# Patient Record
Sex: MALE | Race: BLACK OR AFRICAN AMERICAN | Employment: OTHER
[De-identification: names, ages, dates, MRNs, and addresses within clinical notes are randomized per-mention and may not be internally consistent; named-entity substitution may affect disease eponyms.]

---

## 2018-01-15 PROBLEM — N40.0 BENIGN PROSTATIC HYPERPLASIA WITHOUT LOWER URINARY TRACT SYMPTOMS: Status: ACTIVE | Noted: 2018-01-15

## 2018-01-15 PROBLEM — N40.0 ENLARGED PROSTATE: Status: RESOLVED | Noted: 2018-01-15 | Resolved: 2018-01-15

## 2018-01-15 PROBLEM — N40.0 ENLARGED PROSTATE: Status: ACTIVE | Noted: 2018-01-15

## 2018-06-18 PROBLEM — I44.2 COMPLETE HEART BLOCK (HCC): Status: ACTIVE | Noted: 2018-06-18

## 2018-10-26 PROBLEM — N42.9 DISEASE OF PROSTATE: Status: ACTIVE | Noted: 2018-10-26

## 2021-05-03 PROBLEM — I35.0 AORTIC VALVE STENOSIS: Status: ACTIVE | Noted: 2021-05-03

## 2021-07-26 PROBLEM — Z95.0 PACEMAKER: Status: ACTIVE | Noted: 2021-07-26

## 2022-04-21 PROBLEM — I12.9 RENAL HYPERTENSION: Status: ACTIVE | Noted: 2022-04-21

## 2022-04-21 PROBLEM — N18.31 CHRONIC RENAL FAILURE, STAGE 3A (HCC): Status: ACTIVE | Noted: 2022-04-21

## 2022-06-06 ENCOUNTER — HOSPITAL ENCOUNTER (OUTPATIENT)
Dept: GENERAL RADIOLOGY | Facility: CLINIC | Age: 82
Discharge: HOME | End: 2022-06-08
Payer: MEDICARE

## 2022-06-06 DIAGNOSIS — M54.50 LUMBAR BACK PAIN: ICD-10-CM

## 2022-06-06 PROCEDURE — 72100 X-RAY EXAM L-S SPINE 2/3 VWS: CPT

## 2022-11-18 ENCOUNTER — HOSPITAL ENCOUNTER (OUTPATIENT)
Dept: CT IMAGING | Facility: CLINIC | Age: 82
Discharge: HOME | End: 2022-11-20
Payer: MEDICARE

## 2022-11-18 DIAGNOSIS — G89.29 CHRONIC BILATERAL LOW BACK PAIN WITH BILATERAL SCIATICA: ICD-10-CM

## 2022-11-18 DIAGNOSIS — M51.36 DDD (DEGENERATIVE DISC DISEASE), LUMBAR: ICD-10-CM

## 2022-11-18 DIAGNOSIS — M54.42 CHRONIC BILATERAL LOW BACK PAIN WITH BILATERAL SCIATICA: ICD-10-CM

## 2022-11-18 DIAGNOSIS — M54.41 CHRONIC BILATERAL LOW BACK PAIN WITH BILATERAL SCIATICA: ICD-10-CM

## 2022-11-18 PROCEDURE — 72131 CT LUMBAR SPINE W/O DYE: CPT

## 2025-02-11 LAB
PROSTATE SPECIFIC ANTIGEN: 1.83 NG/ML

## 2025-05-30 ENCOUNTER — HOSPITAL ENCOUNTER (OUTPATIENT)
Age: 85
Discharge: HOME | End: 2025-05-30
Payer: MEDICARE

## 2025-05-30 DIAGNOSIS — I35.0 AORTIC VALVE STENOSIS, ETIOLOGY OF CARDIAC VALVE DISEASE UNSPECIFIED: ICD-10-CM

## 2025-05-30 LAB
EGFR, POC: 37 ML/MIN/1.73M2
POC CREATININE: 1.8 MG/DL (ref 0.51–1.19)

## 2025-05-30 PROCEDURE — 75574 CT ANGIO HRT W/3D IMAGE: CPT

## 2025-05-30 PROCEDURE — 82565 ASSAY OF CREATININE: CPT

## 2025-05-30 PROCEDURE — 6360000004 HC RX CONTRAST MEDICATION: Performed by: FAMILY MEDICINE

## 2025-05-30 RX ORDER — IOPAMIDOL 755 MG/ML
124 INJECTION, SOLUTION INTRAVASCULAR
Status: COMPLETED | OUTPATIENT
Start: 2025-05-30 | End: 2025-05-30

## 2025-05-30 RX ADMIN — IOPAMIDOL 124 ML: 755 INJECTION, SOLUTION INTRAVENOUS at 10:45

## 2025-06-05 ENCOUNTER — OFFICE VISIT (OUTPATIENT)
Age: 85
End: 2025-06-05
Payer: MEDICARE

## 2025-06-05 VITALS
WEIGHT: 214 LBS | HEART RATE: 75 BPM | SYSTOLIC BLOOD PRESSURE: 151 MMHG | DIASTOLIC BLOOD PRESSURE: 80 MMHG | HEIGHT: 69 IN | BODY MASS INDEX: 31.7 KG/M2

## 2025-06-05 DIAGNOSIS — N52.8 OTHER MALE ERECTILE DYSFUNCTION: Primary | ICD-10-CM

## 2025-06-05 DIAGNOSIS — E29.1 HYPOGONADISM MALE: ICD-10-CM

## 2025-06-05 LAB
BILIRUBIN, POC: NORMAL
BLOOD URINE, POC: NORMAL
CLARITY, POC: CLEAR
COLOR, POC: YELLOW
GLUCOSE URINE, POC: >=1000 MG/DL
KETONES, POC: NORMAL
LEUKOCYTE EST, POC: NORMAL
NITRITE, POC: POSITIVE
PH, POC: NORMAL
PROTEIN, POC: 30 MG/DL
SPECIFIC GRAVITY, POC: NORMAL
UROBILINOGEN, POC: NORMAL

## 2025-06-05 PROCEDURE — 1159F MED LIST DOCD IN RCRD: CPT | Performed by: SPECIALIST

## 2025-06-05 PROCEDURE — 1123F ACP DISCUSS/DSCN MKR DOCD: CPT | Performed by: SPECIALIST

## 2025-06-05 PROCEDURE — 99204 OFFICE O/P NEW MOD 45 MIN: CPT | Performed by: SPECIALIST

## 2025-06-05 PROCEDURE — 81003 URINALYSIS AUTO W/O SCOPE: CPT | Performed by: SPECIALIST

## 2025-06-05 RX ORDER — HYDROCHLOROTHIAZIDE 25 MG/1
25 TABLET ORAL DAILY
COMMUNITY

## 2025-06-05 RX ORDER — LOSARTAN POTASSIUM 100 MG/1
100 TABLET ORAL DAILY
COMMUNITY

## 2025-06-05 NOTE — PROGRESS NOTES
Bruno Chandra MD [facility] Urology Consultation / New Patient Visit    Patient:  Tucker Martinez  YOB: 1940  Date: 6/5/2025    Patient seen at the request of Henry Turner MD  for purpose of evaluation of ED.    HISTORY OF PRESENT ILLNESS:   The patient is a 84 y.o. male who presents today for evaluation of the following problems:   The patient presents with long standing insulin dependent diabetes mellitus and ED which does not respond to Tadalafil (Cialis) 20 mg prn ED.   Patient getting Testosterone cypionate IM injections, 175 mg every 2 weeks for Testosterone deficiency.  Discussed the role of Triple mix intracorporeal injection Rx for ED and patient was given an Rx and will set up an OV to be shown how to use it properly.    Lower urinary tract symptoms: urgency, frequency, and nocturia, 2 times per night   Today's AUA Symptom Score (QOL): 7 (2)  (Patient's old records have been requested, reviewed and summarized in today's note: 4/28/25 office note of Henry Turner MD)    Summary of old records:   ED: little response to Tadalafil (Cialis) 20 mg prn ED; Rx for Trimix  Testosterone deficiency: Testosterone cypionate IM injections, 175 mg every 2 weeks      Procedures Today: N/A    Urinalysis today:  Results for orders placed or performed in visit on 06/05/25   POCT Urinalysis No Micro (Auto)   Result Value Ref Range    Color, UA yellow     Clarity, UA clear     Glucose, UA POC >=1,000 mg/dL    Bilirubin, UA      Ketones, UA      Spec Grav, UA      Blood, UA POC trace-intact     pH, UA      Protein, UA POC 30 mg/dL    Urobilinogen, UA      Leukocytes, UA neg     Nitrite, UA positive        Last BUN and creatinine:  Lab Results   Component Value Date    BUN 29 (H) 02/06/2025     Lab Results   Component Value Date    CREATININE 1.8 (H) 05/30/2025       Last CBC:  Lab Results   Component Value Date    WBC 6.52 02/11/2025    HGB 17.2 02/11/2025    HCT

## 2025-06-12 ENCOUNTER — OFFICE VISIT (OUTPATIENT)
Age: 85
End: 2025-06-12
Payer: MEDICARE

## 2025-06-12 VITALS — WEIGHT: 214 LBS | HEIGHT: 69 IN | BODY MASS INDEX: 31.7 KG/M2

## 2025-06-12 DIAGNOSIS — N52.8 OTHER MALE ERECTILE DYSFUNCTION: Primary | ICD-10-CM

## 2025-06-12 DIAGNOSIS — E29.1 HYPOGONADISM MALE: ICD-10-CM

## 2025-06-12 PROCEDURE — 99213 OFFICE O/P EST LOW 20 MIN: CPT | Performed by: SPECIALIST

## 2025-06-12 PROCEDURE — 1123F ACP DISCUSS/DSCN MKR DOCD: CPT | Performed by: SPECIALIST

## 2025-06-12 PROCEDURE — 1159F MED LIST DOCD IN RCRD: CPT | Performed by: SPECIALIST

## 2025-06-12 NOTE — PROGRESS NOTES
was able to inject himself without difficulty.  He will escalate the dose as needed.           Bruno Chandra MD [facility]  6/12/2025 10:01 AM

## 2025-06-24 PROBLEM — I25.10 CORONARY ARTERY DISEASE INVOLVING NATIVE CORONARY ARTERY OF NATIVE HEART WITHOUT ANGINA PECTORIS: Status: ACTIVE | Noted: 2025-06-24

## 2025-07-01 ENCOUNTER — TELEPHONE (OUTPATIENT)
Dept: INFUSION THERAPY | Facility: MEDICAL CENTER | Age: 85
End: 2025-07-01

## 2025-07-01 NOTE — TELEPHONE ENCOUNTER
New phlebotomy order 6/24/25, [facility] physicians, paper order with paper order for H&H.  Remove 500 ml blood for HCT> 47, secondary polycythemia diagnosis.  One time order.    Chart to front office and order noted.

## 2025-07-03 ENCOUNTER — HOSPITAL ENCOUNTER (OUTPATIENT)
Dept: INFUSION THERAPY | Facility: MEDICAL CENTER | Age: 85
Discharge: HOME | End: 2025-07-03
Payer: MEDICARE

## 2025-07-03 ENCOUNTER — HOSPITAL ENCOUNTER (OUTPATIENT)
Facility: MEDICAL CENTER | Age: 85
Discharge: HOME | End: 2025-07-03
Payer: MEDICARE

## 2025-07-03 ENCOUNTER — HOSPITAL ENCOUNTER (OUTPATIENT)
Age: 85
Setting detail: SPECIMEN
Discharge: HOME | End: 2025-07-03
Payer: MEDICARE

## 2025-07-03 VITALS
HEART RATE: 60 BPM | TEMPERATURE: 98.8 F | SYSTOLIC BLOOD PRESSURE: 148 MMHG | RESPIRATION RATE: 18 BRPM | DIASTOLIC BLOOD PRESSURE: 70 MMHG

## 2025-07-03 DIAGNOSIS — E29.1 HYPOGONADISM MALE: ICD-10-CM

## 2025-07-03 DIAGNOSIS — D75.1 POLYCYTHEMIA: ICD-10-CM

## 2025-07-03 DIAGNOSIS — N42.9 DISEASE OF PROSTATE: ICD-10-CM

## 2025-07-03 LAB
ALT SERPL-CCNC: 38 U/L (ref 10–50)
AST SERPL-CCNC: 31 U/L (ref 10–50)
BASOPHILS # BLD: 0.03 K/UL (ref 0–0.2)
BASOPHILS NFR BLD: 1 % (ref 0–2)
EOSINOPHIL # BLD: 0.12 K/UL (ref 0–0.44)
EOSINOPHILS RELATIVE PERCENT: 2 % (ref 1–4)
ERYTHROCYTE [DISTWIDTH] IN BLOOD BY AUTOMATED COUNT: 13.9 % (ref 11.8–14.4)
ESTRADIOL LEVEL: 19.7 PG/ML (ref 27–52)
HCT VFR BLD AUTO: 50.2 % (ref 40.7–50.3)
HCT VFR BLD AUTO: 50.3 % (ref 40.7–50.3)
HGB BLD-MCNC: 17.2 G/DL (ref 13–17)
HGB BLD-MCNC: 17.4 G/DL (ref 13–17)
IMM GRANULOCYTES # BLD AUTO: 0.03 K/UL (ref 0–0.3)
IMM GRANULOCYTES NFR BLD: 1 %
LYMPHOCYTES NFR BLD: 1.78 K/UL (ref 1.1–3.7)
LYMPHOCYTES RELATIVE PERCENT: 32 % (ref 24–43)
MCH RBC QN AUTO: 31 PG (ref 25.2–33.5)
MCHC RBC AUTO-ENTMCNC: 34.3 G/DL (ref 28.4–34.8)
MCV RBC AUTO: 90.6 FL (ref 82.6–102.9)
MONOCYTES NFR BLD: 0.5 K/UL (ref 0.1–1.2)
MONOCYTES NFR BLD: 9 % (ref 3–12)
NEUTROPHILS NFR BLD: 55 % (ref 36–65)
NEUTS SEG NFR BLD: 3.06 K/UL (ref 1.5–8.1)
NRBC BLD-RTO: 0 PER 100 WBC
PLATELET # BLD AUTO: 149 K/UL (ref 138–453)
PMV BLD AUTO: 10.9 FL (ref 8.1–13.5)
PSA SERPL-MCNC: 1.51 NG/ML (ref 0–4)
RBC # BLD AUTO: 5.54 M/UL (ref 4.21–5.77)
TESTOST SERPL-MCNC: 955 NG/DL (ref 193–740)
WBC OTHER # BLD: 5.5 K/UL (ref 3.5–11.3)

## 2025-07-03 PROCEDURE — 36415 COLL VENOUS BLD VENIPUNCTURE: CPT

## 2025-07-03 PROCEDURE — 99195 PHLEBOTOMY: CPT

## 2025-07-03 PROCEDURE — 85025 COMPLETE CBC W/AUTO DIFF WBC: CPT

## 2025-07-03 PROCEDURE — 82670 ASSAY OF TOTAL ESTRADIOL: CPT

## 2025-07-03 PROCEDURE — 84460 ALANINE AMINO (ALT) (SGPT): CPT

## 2025-07-03 PROCEDURE — 84450 TRANSFERASE (AST) (SGOT): CPT

## 2025-07-03 PROCEDURE — 84153 ASSAY OF PSA TOTAL: CPT

## 2025-07-03 PROCEDURE — 84403 ASSAY OF TOTAL TESTOSTERONE: CPT

## 2025-07-03 NOTE — PROGRESS NOTES
Pt arrives per amb per self and HCT 50.3, Order reviewed and parameters are HCT > 47 proceed with phlebotomy.  Explained procedure to pt and is his first.  Cleansed site with chloraprep and tourniquet applied and # 16 needle with closed bag collection system and 500 ml obtained from lt ac and then tourniquet and needle removed and pt observed for 30 min and drinking fluids and pt discharged per amb per self with stable BP and no further appts at this time.